# Patient Record
Sex: FEMALE | Race: WHITE | URBAN - METROPOLITAN AREA
[De-identification: names, ages, dates, MRNs, and addresses within clinical notes are randomized per-mention and may not be internally consistent; named-entity substitution may affect disease eponyms.]

---

## 2020-07-25 ENCOUNTER — TELEPHONE ENCOUNTER (OUTPATIENT)
Dept: URBAN - METROPOLITAN AREA CLINIC 13 | Facility: CLINIC | Age: 85
End: 2020-07-25

## 2020-07-25 RX ORDER — ASCORBIC ACID 500 MG
TAKE 1 TABLET DAILY TABLET ORAL
Refills: 0 | OUTPATIENT
Start: 2007-09-26 | End: 2011-08-19

## 2020-07-26 ENCOUNTER — TELEPHONE ENCOUNTER (OUTPATIENT)
Dept: URBAN - METROPOLITAN AREA CLINIC 13 | Facility: CLINIC | Age: 85
End: 2020-07-26

## 2020-07-26 RX ORDER — ASCORBIC ACID 500 MG
TAKE  TABLET  PT STATES 500MGS DAILY TABLET ORAL
Refills: 0 | Status: ACTIVE | COMMUNITY
Start: 2011-08-19

## 2020-07-26 RX ORDER — RALOXIFENE HCL 60 MG
TABLET ORAL
Qty: 30 | Refills: 0 | Status: ACTIVE | COMMUNITY
Start: 2013-10-14

## 2020-07-26 RX ORDER — METRONIDAZOLE 500 MG/1
TABLET ORAL
Qty: 30 | Refills: 0 | Status: ACTIVE | COMMUNITY
Start: 2014-07-10

## 2020-07-26 RX ORDER — CALCIUM CITRATE/VITAMIN D3 200MG-6.25
TAKE  TABLET DAILY PT STATES 400MGS TABLET ORAL
Refills: 0 | Status: ACTIVE | COMMUNITY
Start: 2011-08-19

## 2020-07-26 RX ORDER — AMOXICILLIN 875 MG/1
TABLET, FILM COATED ORAL
Qty: 28 | Refills: 0 | Status: ACTIVE | COMMUNITY
Start: 2012-01-10

## 2020-07-26 RX ORDER — MONTELUKAST SODIUM 10 MG/1
TABLET, FILM COATED ORAL
Qty: 90 | Refills: 0 | Status: ACTIVE | COMMUNITY
Start: 2014-04-21

## 2020-07-26 RX ORDER — CARBINOXAMINE MALEATE 4 MG/1
TABLET ORAL
Qty: 56 | Refills: 0 | Status: ACTIVE | COMMUNITY
Start: 2013-10-14

## 2020-07-26 RX ORDER — LEVOCETIRIZINE DIHYDROCHLORIDE 5 MG/1
TABLET ORAL
Qty: 30 | Refills: 0 | Status: ACTIVE | COMMUNITY
Start: 2012-01-10

## 2021-02-10 ENCOUNTER — OFFICE VISIT (OUTPATIENT)
Dept: URBAN - METROPOLITAN AREA CLINIC 107 | Facility: CLINIC | Age: 86
End: 2021-02-10

## 2021-03-04 ENCOUNTER — OFFICE VISIT (OUTPATIENT)
Dept: URBAN - METROPOLITAN AREA CLINIC 113 | Facility: CLINIC | Age: 86
End: 2021-03-04
Payer: MEDICARE

## 2021-03-04 ENCOUNTER — WEB ENCOUNTER (OUTPATIENT)
Dept: URBAN - METROPOLITAN AREA CLINIC 113 | Facility: CLINIC | Age: 86
End: 2021-03-04

## 2021-03-04 VITALS
DIASTOLIC BLOOD PRESSURE: 67 MMHG | SYSTOLIC BLOOD PRESSURE: 137 MMHG | RESPIRATION RATE: 20 BRPM | HEART RATE: 68 BPM | HEIGHT: 64 IN | TEMPERATURE: 97.8 F | WEIGHT: 100.5 LBS | BODY MASS INDEX: 17.16 KG/M2

## 2021-03-04 DIAGNOSIS — K58.2 IRRITABLE BOWEL SYNDROME WITH BOTH CONSTIPATION AND DIARRHEA: ICD-10-CM

## 2021-03-04 DIAGNOSIS — K92.89 GAS BLOAT SYNDROME: ICD-10-CM

## 2021-03-04 PROCEDURE — 99203 OFFICE O/P NEW LOW 30 MIN: CPT | Performed by: NURSE PRACTITIONER

## 2021-03-04 RX ORDER — METRONIDAZOLE 500 MG/1
TABLET ORAL
Qty: 30 | Refills: 0 | Status: DISCONTINUED | COMMUNITY
Start: 2014-07-10

## 2021-03-04 RX ORDER — MONTELUKAST SODIUM 10 MG/1
TABLET, FILM COATED ORAL
Qty: 90 | Refills: 0 | Status: DISCONTINUED | COMMUNITY
Start: 2014-04-21

## 2021-03-04 RX ORDER — CARBINOXAMINE MALEATE 4 MG/1
TABLET ORAL
Qty: 56 | Refills: 0 | Status: DISCONTINUED | COMMUNITY
Start: 2013-10-14

## 2021-03-04 RX ORDER — CALCIUM CITRATE/VITAMIN D3 200MG-6.25
TAKE  TABLET DAILY PT STATES 400MGS TABLET ORAL
Refills: 0 | Status: ACTIVE | COMMUNITY
Start: 2011-08-19

## 2021-03-04 RX ORDER — AMOXICILLIN 875 MG/1
TABLET, FILM COATED ORAL
Qty: 28 | Refills: 0 | Status: DISCONTINUED | COMMUNITY
Start: 2012-01-10

## 2021-03-04 RX ORDER — LEVOCETIRIZINE DIHYDROCHLORIDE 5 MG/1
TABLET ORAL
Qty: 30 | Refills: 0 | Status: DISCONTINUED | COMMUNITY
Start: 2012-01-10

## 2021-03-04 RX ORDER — ASCORBIC ACID 500 MG
TAKE  TABLET  PT STATES 500MGS DAILY TABLET ORAL
Refills: 0 | Status: ACTIVE | COMMUNITY
Start: 2011-08-19

## 2021-03-04 RX ORDER — RALOXIFENE HCL 60 MG
TABLET ORAL
Qty: 30 | Refills: 0 | Status: DISCONTINUED | COMMUNITY
Start: 2013-10-14

## 2021-03-04 NOTE — HPI-TODAY'S VISIT:
85-year-old woman with a history of diverticulosis, esophageal reflux, functional dyspepsia, mesenteric cyst (resolved), benign liver hemangiomas, presenting for evaluation of diarrhea. She was referred back to our office on 1/14/2021 by Dr. Denney.  According to referral notes, she has a long history of diarrhea and abdominal pain following an extensive work-up with Dr. Espinoza, including colonoscopy, EGD and CT imaging.  She presents today with complaints of alternating bowel habits. She complains of generalized abdominal cramping. Cramping typically occurs post prandially. Cramping typically resolves on its own. It is unclear if she has improvement of abdominal pain with passing a bowel movement or passing gas. Lying flat and becoming completely still helps the pain to alleviate.

## 2021-04-15 ENCOUNTER — TELEPHONE ENCOUNTER (OUTPATIENT)
Dept: URBAN - METROPOLITAN AREA CLINIC 113 | Facility: CLINIC | Age: 86
End: 2021-04-15

## 2021-05-04 ENCOUNTER — OFFICE VISIT (OUTPATIENT)
Dept: URBAN - METROPOLITAN AREA CLINIC 113 | Facility: CLINIC | Age: 86
End: 2021-05-04
Payer: MEDICARE

## 2021-05-04 ENCOUNTER — WEB ENCOUNTER (OUTPATIENT)
Dept: URBAN - METROPOLITAN AREA CLINIC 113 | Facility: CLINIC | Age: 86
End: 2021-05-04

## 2021-05-04 ENCOUNTER — TELEPHONE ENCOUNTER (OUTPATIENT)
Dept: URBAN - METROPOLITAN AREA CLINIC 113 | Facility: CLINIC | Age: 86
End: 2021-05-04

## 2021-05-04 VITALS
DIASTOLIC BLOOD PRESSURE: 71 MMHG | HEIGHT: 64 IN | SYSTOLIC BLOOD PRESSURE: 125 MMHG | BODY MASS INDEX: 16.39 KG/M2 | WEIGHT: 96 LBS | TEMPERATURE: 97.1 F | HEART RATE: 72 BPM

## 2021-05-04 DIAGNOSIS — K40.90 INGUINAL HERNIA: ICD-10-CM

## 2021-05-04 DIAGNOSIS — K57.90 DIVERTICULOSIS: ICD-10-CM

## 2021-05-04 DIAGNOSIS — K58.2 IRRITABLE BOWEL SYNDROME WITH BOTH CONSTIPATION AND DIARRHEA: ICD-10-CM

## 2021-05-04 PROCEDURE — 99214 OFFICE O/P EST MOD 30 MIN: CPT | Performed by: INTERNAL MEDICINE

## 2021-05-04 RX ORDER — CALCIUM CITRATE/VITAMIN D3 200MG-6.25
TAKE  TABLET DAILY PT STATES 400MGS TABLET ORAL
Refills: 0 | Status: ACTIVE | COMMUNITY
Start: 2011-08-19

## 2021-05-04 RX ORDER — ASCORBIC ACID 500 MG
TAKE  TABLET  PT STATES 500MGS DAILY TABLET ORAL
Refills: 0 | Status: ACTIVE | COMMUNITY
Start: 2011-08-19

## 2021-05-04 NOTE — HPI-TODAY'S VISIT:
85-year-old woman with a history of diverticulosis, esophageal reflux, functional dyspepsia, mesenteric cyst (resolved), benign liver hemangiomas, presenting for evaluation of diarrhea. . She was referred back to our office on 1/14/2021 by Dr. Denney.  She has been having abdominal pain periodically.  She did go to the ER and had a CT scan in April which revealed diverticulitis and she was treated with Cipro and Flagyl.  Doing a bit better at this time.  Utilizing dicyclomine as required.  Also utilizing Pepto-Bismol.  No recent reports of fever or significant abdominal distention. . Celiac serologies were negative Jan 2021. . CT scan April 12, 2021.  Wall thickening and edema of the sigmoid colon identified with stranding consistent with diverticulitis.  Left inguinal hernia with small intestinal loops without obstruction identified.  . Colonoscopy by Dr. Espinoza Oct 2020. Diverticulosis and reportedly no polyps noted. . CT scan on 7-22-11 that revealed mild outward bulging slightly indurated mesenteric fat in the left pelvis concerning for panniculitis.  . EGD 8-19-11. Findings included mild granular mucosa at the GEJ, a small hiatal hernia and extrinsic compression of the gastric fundus. Gastric biopsies were negative for H. pylori. . Colonoscopy 9/19/07 tortuous sigmoid colon, a "floppy colon", moderate sigmoid diverticulosis, and was otherwise negative.

## 2021-09-20 ENCOUNTER — OFFICE VISIT (OUTPATIENT)
Dept: URBAN - METROPOLITAN AREA CLINIC 113 | Facility: CLINIC | Age: 86
End: 2021-09-20
Payer: MEDICARE

## 2021-09-20 VITALS
SYSTOLIC BLOOD PRESSURE: 129 MMHG | RESPIRATION RATE: 20 BRPM | TEMPERATURE: 97.8 F | WEIGHT: 95 LBS | HEIGHT: 64 IN | DIASTOLIC BLOOD PRESSURE: 70 MMHG | HEART RATE: 76 BPM | BODY MASS INDEX: 16.22 KG/M2

## 2021-09-20 DIAGNOSIS — K57.90 DIVERTICULOSIS: ICD-10-CM

## 2021-09-20 DIAGNOSIS — K40.90 INGUINAL HERNIA: ICD-10-CM

## 2021-09-20 DIAGNOSIS — K58.2 IRRITABLE BOWEL SYNDROME WITH BOTH CONSTIPATION AND DIARRHEA: ICD-10-CM

## 2021-09-20 PROBLEM — 398050005 DIVERTICULAR DISEASE OF COLON: Status: ACTIVE | Noted: 2021-05-04

## 2021-09-20 PROCEDURE — 99213 OFFICE O/P EST LOW 20 MIN: CPT | Performed by: INTERNAL MEDICINE

## 2021-09-20 RX ORDER — CALCIUM CITRATE/VITAMIN D3 200MG-6.25
TAKE  TABLET DAILY PT STATES 400MGS TABLET ORAL
Refills: 0 | Status: ACTIVE | COMMUNITY
Start: 2011-08-19

## 2021-09-20 RX ORDER — ASCORBIC ACID 500 MG
TAKE  TABLET  PT STATES 500MGS DAILY TABLET ORAL
Refills: 0 | Status: ACTIVE | COMMUNITY
Start: 2011-08-19

## 2021-09-20 NOTE — HPI-TODAY'S VISIT:
85-year-old woman with a history of diverticulosis, esophageal reflux, functional dyspepsia, mesenteric cyst (resolved), benign liver hemangiomas, presenting for evaluation of diarrhea. . She was referred back to our office on 1/14/2021 by Dr. Denney.  She has been having abdominal pain periodically.  She did go to the ER and had a CT scan in April which revealed diverticulitis and she was treated with Cipro and Flagyl.  Doing a bit better at this time.  Utilizing dicyclomine as required.  Also utilizing Pepto-Bismol.  No recent reports of fever or significant abdominal distention. . Sept 20, 2021. Overall doing very well at this time and tries to follow the FODMAP diet.  She is walking every day.  Asked about B12.  I do not think it will hurt her.  "The shot. . Celiac serologies were negative Jan 2021. . CT scan April 12, 2021.  Wall thickening and edema of the sigmoid colon identified with stranding consistent with diverticulitis.  Left inguinal hernia with small intestinal loops without obstruction identified.  . Colonoscopy by Dr. Espinoza Oct 2020. Diverticulosis and reportedly no polyps noted. . CT scan on 7-22-11 that revealed mild outward bulging slightly indurated mesenteric fat in the left pelvis concerning for panniculitis.  . EGD 8-19-11. Findings included mild granular mucosa at the GEJ, a small hiatal hernia and extrinsic compression of the gastric fundus. Gastric biopsies were negative for H. pylori. . Colonoscopy 9/19/07 tortuous sigmoid colon, a "floppy colon", moderate sigmoid diverticulosis, and was otherwise negative.

## 2022-03-21 ENCOUNTER — OFFICE VISIT (OUTPATIENT)
Dept: URBAN - METROPOLITAN AREA CLINIC 113 | Facility: CLINIC | Age: 87
End: 2022-03-21
Payer: MEDICARE

## 2022-03-21 VITALS
RESPIRATION RATE: 20 BRPM | WEIGHT: 102 LBS | DIASTOLIC BLOOD PRESSURE: 68 MMHG | TEMPERATURE: 98.2 F | HEIGHT: 64 IN | HEART RATE: 79 BPM | SYSTOLIC BLOOD PRESSURE: 121 MMHG | BODY MASS INDEX: 17.42 KG/M2

## 2022-03-21 DIAGNOSIS — K58.2 IRRITABLE BOWEL SYNDROME WITH BOTH CONSTIPATION AND DIARRHEA: ICD-10-CM

## 2022-03-21 PROCEDURE — 99213 OFFICE O/P EST LOW 20 MIN: CPT | Performed by: INTERNAL MEDICINE

## 2022-03-21 RX ORDER — ASCORBIC ACID 500 MG
TAKE  TABLET  PT STATES 500MGS DAILY TABLET ORAL
Refills: 0 | Status: ACTIVE | COMMUNITY
Start: 2011-08-19

## 2022-03-21 RX ORDER — CALCIUM CITRATE/VITAMIN D3 200MG-6.25
TAKE  TABLET DAILY PT STATES 400MGS TABLET ORAL
Refills: 0 | Status: ACTIVE | COMMUNITY
Start: 2011-08-19

## 2022-03-21 RX ORDER — PNV NO.95/FERROUS FUM/FOLIC AC 28MG-0.8MG
AS DIRECTED TABLET ORAL
Status: ACTIVE | COMMUNITY

## 2022-03-21 NOTE — HPI-TODAY'S VISIT:
86-year-old female with a history of diverticulitis, esophageal reflux, functional dyspepsia, mesenteric cyst (resolved), benign liver hemangiomas, presenting for follow-up of diarrhea. She was seen in the office in September 2021 for follow-up of alternating bowel habits attributed to irritable bowel syndrome. Her symptoms had improved with a low-FODMAP diet. She was encouraged use of Benefiber and MOM when needed. Overall, she is doing well. She has a nonbloody stool most days with the use of milk of magnesia when needed. She has not experienced further diarrhea. She continues to follow a modified low-FODMAP diet and exercises regularly. About once a week, she may experience an exacerbation of abdominal pain which responds to Pepto Bismol when needed. This has lessened in frequency and severity. She never filled the dicyclomine prescription provided a few visits ago. She is taking vitamin B12 and Turmeric which she feels have been helpful. She does admit to an increase in family-related stress.

## 2022-03-21 NOTE — HPI-OTHER HISTORIES
CT scan April 12, 2021.  Wall thickening and edema of the sigmoid colon identified with stranding consistent with diverticulitis.  Left inguinal hernia with small intestinal loops without obstruction identified.  Celiac serologies were negative Jan 2021. Colonoscopy by Dr. Espinoza Oct 2020: distal rectal nodule 1cm at dentate and sigmoid diverticulosis. Biopsies showed mucosal prolapse polyp with focal erosion/solitary rectal ulcer syndrome, negative for dysplasia.  EGD 8/19/11: mild granular mucosa at the GEJ, a small hiatal hernia and extrinsic compression of the gastric fundus. Gastric biopsies were negative for H. pylori. Colonoscopy 9/19/07 tortuous sigmoid colon, a "floppy colon", moderate sigmoid diverticulosis, and was otherwise negative.

## 2022-07-20 ENCOUNTER — CLAIMS CREATED FROM THE CLAIM WINDOW (OUTPATIENT)
Dept: URBAN - METROPOLITAN AREA MEDICAL CENTER 19 | Facility: MEDICAL CENTER | Age: 87
End: 2022-07-20
Payer: MEDICARE

## 2022-07-20 DIAGNOSIS — R63.0 DECREASED APPETITE: ICD-10-CM

## 2022-07-20 DIAGNOSIS — R11.0 NAUSEA: ICD-10-CM

## 2022-07-20 DIAGNOSIS — R10.84 ABDOMINAL CRAMPING, GENERALIZED: ICD-10-CM

## 2022-07-20 DIAGNOSIS — K40.90 INGUINAL HERNIA: ICD-10-CM

## 2022-07-20 DIAGNOSIS — K59.09 CHANGE IN BOWEL MOVEMENTS INTERMITTENT CONSTIPATION. URGENCY IN THE MORNING.: ICD-10-CM

## 2022-07-20 PROCEDURE — 99253 IP/OBS CNSLTJ NEW/EST LOW 45: CPT | Performed by: INTERNAL MEDICINE

## 2022-07-20 PROCEDURE — 99221 1ST HOSP IP/OBS SF/LOW 40: CPT | Performed by: INTERNAL MEDICINE

## 2022-07-30 PROBLEM — 79890006 LOSS OF APPETITE: Status: ACTIVE | Noted: 2022-07-30

## 2022-07-30 PROBLEM — 396232000 INGUINAL HERNIA: Status: ACTIVE | Noted: 2021-05-04

## 2022-08-31 ENCOUNTER — CLAIMS CREATED FROM THE CLAIM WINDOW (OUTPATIENT)
Dept: URBAN - METROPOLITAN AREA CLINIC 113 | Facility: CLINIC | Age: 87
End: 2022-08-31
Payer: MEDICARE

## 2022-08-31 VITALS
BODY MASS INDEX: 17.58 KG/M2 | HEART RATE: 91 BPM | RESPIRATION RATE: 20 BRPM | DIASTOLIC BLOOD PRESSURE: 72 MMHG | SYSTOLIC BLOOD PRESSURE: 128 MMHG | TEMPERATURE: 97.8 F | WEIGHT: 103 LBS | HEIGHT: 64 IN

## 2022-08-31 DIAGNOSIS — K58.2 IRRITABLE BOWEL SYNDROME WITH BOTH CONSTIPATION AND DIARRHEA: ICD-10-CM

## 2022-08-31 DIAGNOSIS — D49.0 IPMN (INTRADUCTAL PAPILLARY MUCINOUS NEOPLASM): ICD-10-CM

## 2022-08-31 DIAGNOSIS — R14.0 ABDOMINAL BLOATING: ICD-10-CM

## 2022-08-31 DIAGNOSIS — K40.20 BILATERAL INGUINAL HERNIA WITHOUT OBSTRUCTION OR GANGRENE, RECURRENCE NOT SPECIFIED: ICD-10-CM

## 2022-08-31 DIAGNOSIS — R11.0 NAUSEA: ICD-10-CM

## 2022-08-31 PROCEDURE — 99214 OFFICE O/P EST MOD 30 MIN: CPT

## 2022-08-31 RX ORDER — PNV NO.95/FERROUS FUM/FOLIC AC 28MG-0.8MG
AS DIRECTED TABLET ORAL
Status: ACTIVE | COMMUNITY

## 2022-08-31 RX ORDER — ASCORBIC ACID 500 MG
TAKE  TABLET  PT STATES 500MGS DAILY TABLET ORAL
Refills: 0 | Status: ACTIVE | COMMUNITY
Start: 2011-08-19

## 2022-08-31 RX ORDER — ONDANSETRON 4 MG/1
1 TABLET ON THE TONGUE AND ALLOW TO DISSOLVE TABLET, ORALLY DISINTEGRATING ORAL
Qty: 90 | Refills: 0 | OUTPATIENT
Start: 2022-08-31

## 2022-08-31 RX ORDER — DICYCLOMINE HYDROCHLORIDE 10 MG/1
1 CAPSULE CAPSULE ORAL
Qty: 90 | Refills: 1 | OUTPATIENT
Start: 2022-08-31 | End: 2022-10-30

## 2022-08-31 RX ORDER — HYDROCODONE BITARTRATE AND ACETAMINOPHEN 10; 325 MG/1; MG/1
1 TABLET AS NEEDED TABLET ORAL
Status: ACTIVE | COMMUNITY

## 2022-08-31 RX ORDER — CALCIUM CITRATE/VITAMIN D3 200MG-6.25
TAKE  TABLET DAILY PT STATES 400MGS TABLET ORAL
Refills: 0 | Status: ACTIVE | COMMUNITY
Start: 2011-08-19

## 2022-08-31 NOTE — HPI-TODAY'S VISIT:
86-year-old female with a history of diverticulitis, esophageal reflux, functional dyspepsia, mesenteric cyst (resolved), benign liver hemangiomas presents for follow-up regarding irritable bowel syndrome.  She was last seen on 3/21/2022.  She has a history of abdominal pain and alternating bowel habits attributed to irritable bowel syndrome.  At that time her symptoms will well managed on regimen consisting of a low FODMAP diet and milk of magnesia when needed.  We did discuss the possible benefits of IBgard.  Dicyclomine has been provided in the past however patient never filled this prescription.  She was to continue her current regimen including Pepto-Bismol use as needed.  We would consider IBgard 1 to 2 capsules as needed for abdominal pain in the future.  She had an MRI on her back two weeks ago wihch was an awful experience.  Patient was recently seen as inpatient consultation on 7/12/2022 by Dr. Nichole.  She was admitted with acute on chronic GI symptoms, predominantly nausea and constipation.  Labs at that time revealed normal H/H, normal BMP, normal LFTs with exception of .  CT scan abdomen/pelvis with contrast revealed fluid-filled and slightly distended colon, likely representing diarrheal illness.  There was a compression deformity of the T12 vertebral body.  This is indeterminate in age and correlation with lower thoracic/upper back pain is recommended.  There is a stable left of 0.5 x 0.7 cm pancreatic tail hypoattenuating lesion which may represent a pancreatic IPMN.  CT findings most likely due to patient's severe constipation.  NORRIS was clean without any infection.  Her symptoms have largely improved following a bowel movement after taking a bowel prep.  She was discharged and advised to continue with daily regimen of either milk of magnesia or MiraLAX on a daily basis.  Patient presents today to wheelchair due to severe back and hip pain and her daughter is with her.  The daughter helps to provide most of her history as the patient has severe hearing loss.  The daughter became tearful and upset during the visit while telling me her mother continues to be in pain and no one seems to be able to help her.  She states after her visit with Dr. Nichole in the hospital, she continued to have issues with early satiety, abdominal bloating and decreased appetite.  She has tried and failed IBgard and a low FODMAP diet.  She is not able to tolerate MiraLAX as this caused abdominal cramping.  She cannot tolerate Linzess.  She is seeing Dr. Garg tomorrow for her back.  She had an MRI on her spine a few weeks ago. She may be undergoing some form of treatment for her back and hip.  She is on narcotics for her pain as well.  Patient states her back pain is her most bothersome symptom at this time as well as her abdominal bloating.  She states her bowels will move daily as long as she takes milk of magnesia at night however she is scared to take this the night before her doctors appointments that she does not wish to use the bathrooms.  She has not noticed any specific food trigger for her bloating.  The patient's daughter is not sure if she was ever tried dicyclomine in the past.  She was given Zofran during her hospital course which had helped with nausea however she does not have any left.  She tries to drink excess water to keep her bowels moving however this makes her feel extremely full and bloated.  She does take Tylenol for pain relief as well.  She only recently started using ibuprofen on a very intermittent basis.

## 2022-09-22 ENCOUNTER — TELEPHONE ENCOUNTER (OUTPATIENT)
Dept: URBAN - METROPOLITAN AREA CLINIC 113 | Facility: CLINIC | Age: 87
End: 2022-09-22

## 2022-10-03 ENCOUNTER — LAB OUTSIDE AN ENCOUNTER (OUTPATIENT)
Dept: URBAN - METROPOLITAN AREA CLINIC 113 | Facility: CLINIC | Age: 87
End: 2022-10-03

## 2022-10-03 ENCOUNTER — TELEPHONE ENCOUNTER (OUTPATIENT)
Dept: URBAN - METROPOLITAN AREA CLINIC 113 | Facility: CLINIC | Age: 87
End: 2022-10-03

## 2022-10-03 PROBLEM — 14760008 CONSTIPATION: Status: ACTIVE | Noted: 2022-10-03

## 2022-10-03 RX ORDER — DICYCLOMINE HYDROCHLORIDE 10 MG/1
1 CAPSULE CAPSULE ORAL
Qty: 90 | Refills: 1 | Status: ACTIVE | COMMUNITY
Start: 2022-08-31 | End: 2022-10-30

## 2022-10-03 RX ORDER — ONDANSETRON 4 MG/1
1 TABLET ON THE TONGUE AND ALLOW TO DISSOLVE TABLET, ORALLY DISINTEGRATING ORAL
Qty: 90 | Refills: 0 | Status: ACTIVE | COMMUNITY
Start: 2022-08-31

## 2022-10-03 RX ORDER — HYDROCODONE BITARTRATE AND ACETAMINOPHEN 10; 325 MG/1; MG/1
1 TABLET AS NEEDED TABLET ORAL
Status: ACTIVE | COMMUNITY

## 2022-10-03 RX ORDER — LINACLOTIDE 145 UG/1
1 CAPSULE AT LEAST 30 MINUTES BEFORE THE FIRST MEAL OF THE DAY ON AN EMPTY STOMACH CAPSULE, GELATIN COATED ORAL ONCE A DAY
Qty: 30 | Refills: 6 | OUTPATIENT
Start: 2022-10-03 | End: 2023-05-01

## 2022-10-03 RX ORDER — CALCIUM CITRATE/VITAMIN D3 200MG-6.25
TAKE  TABLET DAILY PT STATES 400MGS TABLET ORAL
Refills: 0 | Status: ACTIVE | COMMUNITY
Start: 2011-08-19

## 2022-10-03 RX ORDER — ASCORBIC ACID 500 MG
TAKE  TABLET  PT STATES 500MGS DAILY TABLET ORAL
Refills: 0 | Status: ACTIVE | COMMUNITY
Start: 2011-08-19

## 2022-10-03 RX ORDER — PNV NO.95/FERROUS FUM/FOLIC AC 28MG-0.8MG
AS DIRECTED TABLET ORAL
Status: ACTIVE | COMMUNITY

## 2022-10-04 ENCOUNTER — OFFICE VISIT (OUTPATIENT)
Dept: URBAN - METROPOLITAN AREA CLINIC 113 | Facility: CLINIC | Age: 87
End: 2022-10-04

## 2022-10-11 ENCOUNTER — TELEPHONE ENCOUNTER (OUTPATIENT)
Dept: URBAN - METROPOLITAN AREA CLINIC 113 | Facility: CLINIC | Age: 87
End: 2022-10-11

## 2022-12-01 ENCOUNTER — TELEPHONE ENCOUNTER (OUTPATIENT)
Dept: URBAN - METROPOLITAN AREA CLINIC 113 | Facility: CLINIC | Age: 87
End: 2022-12-01

## 2022-12-01 ENCOUNTER — OFFICE VISIT (OUTPATIENT)
Dept: URBAN - METROPOLITAN AREA CLINIC 113 | Facility: CLINIC | Age: 87
End: 2022-12-01
Payer: MEDICARE

## 2022-12-01 VITALS
HEART RATE: 95 BPM | SYSTOLIC BLOOD PRESSURE: 112 MMHG | TEMPERATURE: 97.3 F | BODY MASS INDEX: 13.28 KG/M2 | WEIGHT: 77.8 LBS | HEIGHT: 64 IN | RESPIRATION RATE: 16 BRPM | DIASTOLIC BLOOD PRESSURE: 67 MMHG

## 2022-12-01 DIAGNOSIS — K58.2 IRRITABLE BOWEL SYNDROME WITH BOTH CONSTIPATION AND DIARRHEA: ICD-10-CM

## 2022-12-01 DIAGNOSIS — R14.0 ABDOMINAL BLOATING: ICD-10-CM

## 2022-12-01 PROBLEM — 82934008 CHRONIC IDIOPATHIC CONSTIPATION: Status: ACTIVE | Noted: 2022-12-01

## 2022-12-01 PROBLEM — 10743008: Status: ACTIVE | Noted: 2021-03-04

## 2022-12-01 PROCEDURE — 99214 OFFICE O/P EST MOD 30 MIN: CPT | Performed by: NURSE PRACTITIONER

## 2022-12-01 RX ORDER — METRONIDAZOLE 250 MG/1
1 TABLET TABLET ORAL TWICE A DAY
Qty: 14 TABLET | Refills: 0 | OUTPATIENT

## 2022-12-01 RX ORDER — PLECANATIDE 3 MG/1
1 TABLET TABLET ORAL ONCE A DAY
Qty: 90 TABLET | Refills: 3 | OUTPATIENT
Start: 2022-12-01 | End: 2023-11-26

## 2022-12-01 RX ORDER — PREGABALIN 25 MG/1
1 CAPSULE CAPSULE ORAL ONCE A DAY
Status: ACTIVE | COMMUNITY

## 2022-12-01 RX ORDER — ASCORBIC ACID 500 MG
TAKE  TABLET  PT STATES 500MGS DAILY TABLET ORAL
Refills: 0 | Status: ACTIVE | COMMUNITY
Start: 2011-08-19

## 2022-12-01 RX ORDER — LINACLOTIDE 145 UG/1
1 CAPSULE AT LEAST 30 MINUTES BEFORE THE FIRST MEAL OF THE DAY ON AN EMPTY STOMACH CAPSULE, GELATIN COATED ORAL ONCE A DAY
Qty: 30 | Refills: 6 | Status: ON HOLD | COMMUNITY
Start: 2022-10-03 | End: 2023-05-01

## 2022-12-01 RX ORDER — HYDROCODONE BITARTRATE AND ACETAMINOPHEN 10; 325 MG/1; MG/1
1 TABLET AS NEEDED TABLET ORAL
Status: ACTIVE | COMMUNITY

## 2022-12-01 RX ORDER — ONDANSETRON 4 MG/1
1 TABLET ON THE TONGUE AND ALLOW TO DISSOLVE TABLET, ORALLY DISINTEGRATING ORAL
Qty: 90 | Refills: 0 | Status: ACTIVE | COMMUNITY
Start: 2022-08-31

## 2022-12-01 RX ORDER — CALCIUM CITRATE/VITAMIN D3 200MG-6.25
TAKE  TABLET DAILY PT STATES 400MGS TABLET ORAL
Refills: 0 | Status: ACTIVE | COMMUNITY
Start: 2011-08-19

## 2022-12-01 RX ORDER — PNV NO.95/FERROUS FUM/FOLIC AC 28MG-0.8MG
AS DIRECTED TABLET ORAL
Status: ACTIVE | COMMUNITY

## 2023-03-02 ENCOUNTER — OFFICE VISIT (OUTPATIENT)
Dept: URBAN - METROPOLITAN AREA CLINIC 113 | Facility: CLINIC | Age: 88
End: 2023-03-02
Payer: MEDICARE

## 2023-03-02 VITALS
HEART RATE: 90 BPM | WEIGHT: 78 LBS | RESPIRATION RATE: 20 BRPM | HEIGHT: 64 IN | SYSTOLIC BLOOD PRESSURE: 130 MMHG | BODY MASS INDEX: 13.32 KG/M2 | DIASTOLIC BLOOD PRESSURE: 72 MMHG | TEMPERATURE: 97.9 F

## 2023-03-02 DIAGNOSIS — R10.84 GENERALIZED ABDOMINAL PAIN: ICD-10-CM

## 2023-03-02 DIAGNOSIS — K58.1 IRRITABLE BOWEL SYNDROME WITH CONSTIPATION: ICD-10-CM

## 2023-03-02 DIAGNOSIS — R93.5 ABNORMAL ABDOMINAL CT SCAN: ICD-10-CM

## 2023-03-02 PROBLEM — 440630006 IRRITABLE BOWEL SYNDROME CHARACTERIZED BY CONSTIPATION: Status: ACTIVE | Noted: 2023-03-02

## 2023-03-02 PROBLEM — 102614006 GENERALIZED ABDOMINAL PAIN: Status: ACTIVE | Noted: 2022-10-03

## 2023-03-02 PROCEDURE — 99214 OFFICE O/P EST MOD 30 MIN: CPT | Performed by: NURSE PRACTITIONER

## 2023-03-02 RX ORDER — ONDANSETRON 4 MG/1
1 TABLET ON THE TONGUE AND ALLOW TO DISSOLVE TABLET, ORALLY DISINTEGRATING ORAL
Qty: 90 | Refills: 0 | Status: ACTIVE | COMMUNITY
Start: 2022-08-31

## 2023-03-02 RX ORDER — PREGABALIN 25 MG/1
1 CAPSULE CAPSULE ORAL ONCE A DAY
Status: ACTIVE | COMMUNITY

## 2023-03-02 RX ORDER — PNV NO.95/FERROUS FUM/FOLIC AC 28MG-0.8MG
AS DIRECTED TABLET ORAL
Status: ACTIVE | COMMUNITY

## 2023-03-02 RX ORDER — LINACLOTIDE 145 UG/1
1 CAPSULE AT LEAST 30 MINUTES BEFORE THE FIRST MEAL OF THE DAY ON AN EMPTY STOMACH CAPSULE, GELATIN COATED ORAL ONCE A DAY
Qty: 30 | Refills: 6 | Status: ON HOLD | COMMUNITY
Start: 2022-10-03 | End: 2023-05-01

## 2023-03-02 RX ORDER — CALCIUM CITRATE/VITAMIN D3 200MG-6.25
TAKE  TABLET DAILY PT STATES 400MGS TABLET ORAL
Refills: 0 | Status: ACTIVE | COMMUNITY
Start: 2011-08-19

## 2023-03-02 RX ORDER — PLECANATIDE 3 MG/1
1 TABLET TABLET ORAL ONCE A DAY
Qty: 90 TABLET | Refills: 3 | Status: ACTIVE | COMMUNITY
Start: 2022-12-01 | End: 2023-11-26

## 2023-03-02 RX ORDER — ASCORBIC ACID 500 MG
TAKE  TABLET  PT STATES 500MGS DAILY TABLET ORAL
Refills: 0 | Status: ACTIVE | COMMUNITY
Start: 2011-08-19

## 2023-03-02 RX ORDER — METRONIDAZOLE 250 MG/1
1 TABLET TABLET ORAL TWICE A DAY
Qty: 14 TABLET | Refills: 0 | Status: ACTIVE | COMMUNITY

## 2023-03-02 RX ORDER — TENAPANOR HYDROCHLORIDE 53.2 MG/1
1 TABLET PRIOR TO BREAKFAST AND DINNER TABLET ORAL TWICE A DAY
Qty: 180 TABLET | Refills: 3 | OUTPATIENT
Start: 2023-03-02 | End: 2024-02-25

## 2023-03-02 RX ORDER — HYDROCODONE BITARTRATE AND ACETAMINOPHEN 10; 325 MG/1; MG/1
1 TABLET AS NEEDED TABLET ORAL
Status: ACTIVE | COMMUNITY

## 2023-03-02 NOTE — HPI-OTHER HISTORIES
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 Labs 10/4/2022:H/H12.3/37.9, MCV 94.8, , WBC 11.1.  AST 24, ALT 15, .59, T bili 0.4, CMP otherwise unremarkable. CT scan April 12, 2021.  Wall thickening and edema of the sigmoid colon identified with stranding consistent with diverticulitis.  Left inguinal hernia with small intestinal loops without obstruction identified.  Celiac serologies were negative Jan 2021. Colonoscopy by Dr. Espinoza Oct 2020: distal rectal nodule 1cm at dentate and sigmoid diverticulosis. Biopsies showed mucosal prolapse polyp with focal erosion/solitary rectal ulcer syndrome, negative for dysplasia.  EGD 8/19/11: mild granular mucosa at the GEJ, a small hiatal hernia and extrinsic compression of the gastric fundus. Gastric biopsies were negative for H. pylori. Colonoscopy 9/19/07 tortuous sigmoid colon, a "floppy colon", moderate sigmoid diverticulosis, and was otherwise negative.

## 2023-03-02 NOTE — HPI-TODAY'S VISIT:
87-year-old female with a history of diverticulitis, esophageal reflux, functional dyspepsia, mesenteric cyst (resolved), benign liver hemangiomas, presenting for follow-up of abdominal pain, bloating, and constipation.  She was seen in the office in December for evaluation of worsening abdominal pain, bloating and constipation despite dietary modification and milk of magnesia. She was unable to tolerate Linzess due to abdominal cramping and IBgard was ineffective. She was recommended a course of metronidazole for IBS and SIBO, followed by initiation of a daily bowel regimen with Trulance. She was to continue Gas-X as needed. She did not follow any of the recommendations given at the time of the last visit. She did not take the antibiotics due to concern for potential side effects. She was unable to obtain the Trulance due to cost, but unfortunately this was not communicated with our office.  Her symptoms are unchanged. She and her daughter are both visibly frustrated. She has a bowel movement on the days she takes milk of magnesia. She will take it for about two days then hold it for a day, simply because she gets tired of taking the medication. She continues to experience lower abdominal pain, gas and bloating following meals. This does respond to Pepto Bismol when needed, about twice per week. Gas-X and IBgard have not been helpful. She eats very little. She does drink small amounts of ginger ale, sprite and dr. pepper a few times per week. They do not believe this is contributing. Fortunately, her weight is stable. Her symptoms are not new; this has been ongoing for decades.

## 2023-03-03 LAB
A/G RATIO: 2.1
ABSOLUTE BASOPHILS: 57
ABSOLUTE EOSINOPHILS: 50
ABSOLUTE LYMPHOCYTES: 1356
ABSOLUTE MONOCYTES: 383
ABSOLUTE NEUTROPHILS: 5254
ALBUMIN: 4.1
ALKALINE PHOSPHATASE: 131
ALT (SGPT): 30
AST (SGOT): 24
BASOPHILS: 0.8
BILIRUBIN, TOTAL: 0.3
BUN/CREATININE RATIO: (no result)
BUN: 20
CALCIUM: 9.1
CARBON DIOXIDE, TOTAL: 28
CHLORIDE: 104
CREATININE: 0.6
EGFR: 88
EOSINOPHILS: 0.7
GLOBULIN, TOTAL: 2
GLUCOSE: 114
HEMATOCRIT: 39.2
HEMOGLOBIN: 13.5
IMMUNOGLOBULIN A: 289
INTERPRETATION: (no result)
LIPASE: 27
LYMPHOCYTES: 19.1
MCH: 30.4
MCHC: 34.4
MCV: 88.3
MONOCYTES: 5.4
MPV: 10.7
NEUTROPHILS: 74
PLATELET COUNT: 309
POTASSIUM: 4.2
PROTEIN, TOTAL: 6.1
RDW: 12.8
RED BLOOD CELL COUNT: 4.44
SODIUM: 140
TISSUE TRANSGLUTAMINASE AB, IGA: <1
TSH W/REFLEX TO FT4: 4.07
WHITE BLOOD CELL COUNT: 7.1

## 2023-03-14 ENCOUNTER — TELEPHONE ENCOUNTER (OUTPATIENT)
Dept: URBAN - METROPOLITAN AREA CLINIC 113 | Facility: CLINIC | Age: 88
End: 2023-03-14

## 2023-05-24 ENCOUNTER — WEB ENCOUNTER (OUTPATIENT)
Dept: URBAN - METROPOLITAN AREA CLINIC 113 | Facility: CLINIC | Age: 88
End: 2023-05-24

## 2023-05-30 ENCOUNTER — OFFICE VISIT (OUTPATIENT)
Dept: URBAN - METROPOLITAN AREA CLINIC 113 | Facility: CLINIC | Age: 88
End: 2023-05-30
Payer: MEDICARE

## 2023-05-30 VITALS
DIASTOLIC BLOOD PRESSURE: 81 MMHG | TEMPERATURE: 97.3 F | SYSTOLIC BLOOD PRESSURE: 145 MMHG | HEART RATE: 100 BPM | BODY MASS INDEX: 13.35 KG/M2 | HEIGHT: 64 IN | RESPIRATION RATE: 20 BRPM | WEIGHT: 78.2 LBS

## 2023-05-30 DIAGNOSIS — K58.1 IRRITABLE BOWEL SYNDROME WITH CONSTIPATION: ICD-10-CM

## 2023-05-30 DIAGNOSIS — R10.84 GENERALIZED ABDOMINAL PAIN: ICD-10-CM

## 2023-05-30 DIAGNOSIS — K57.50 DIVERTICULOSIS OF BOTH SMALL AND LARGE INTESTINE: ICD-10-CM

## 2023-05-30 DIAGNOSIS — K21.9 GERD: ICD-10-CM

## 2023-05-30 PROCEDURE — 99214 OFFICE O/P EST MOD 30 MIN: CPT | Performed by: INTERNAL MEDICINE

## 2023-05-30 RX ORDER — PNV NO.95/FERROUS FUM/FOLIC AC 28MG-0.8MG
AS DIRECTED TABLET ORAL
Status: ACTIVE | COMMUNITY

## 2023-05-30 RX ORDER — MIRTAZAPINE 15 MG/1
1 TABLET AT BEDTIME TABLET, FILM COATED ORAL ONCE A DAY
Status: ACTIVE | COMMUNITY

## 2023-05-30 RX ORDER — PANTOPRAZOLE SODIUM 20 MG/1
1 TABLET TABLET, DELAYED RELEASE ORAL ONCE A DAY
Qty: 30 | Refills: 11 | OUTPATIENT
Start: 2023-05-30

## 2023-05-30 RX ORDER — HYDROCODONE BITARTRATE AND ACETAMINOPHEN 10; 325 MG/1; MG/1
1 TABLET AS NEEDED TABLET ORAL
Status: ACTIVE | COMMUNITY

## 2023-05-30 RX ORDER — TENAPANOR HYDROCHLORIDE 53.2 MG/1
1 TABLET PRIOR TO BREAKFAST AND DINNER TABLET ORAL TWICE A DAY
Qty: 180 TABLET | Refills: 3 | Status: ACTIVE | COMMUNITY
Start: 2023-03-02 | End: 2024-02-25

## 2023-05-30 RX ORDER — ONDANSETRON 4 MG/1
1 TABLET ON THE TONGUE AND ALLOW TO DISSOLVE TABLET, ORALLY DISINTEGRATING ORAL
Qty: 90 | Refills: 0 | Status: ACTIVE | COMMUNITY
Start: 2022-08-31

## 2023-05-30 RX ORDER — METRONIDAZOLE 250 MG/1
1 TABLET TABLET ORAL TWICE A DAY
Qty: 14 TABLET | Refills: 0 | Status: ACTIVE | COMMUNITY

## 2023-05-30 RX ORDER — CALCIUM CITRATE/VITAMIN D3 200MG-6.25
TAKE  TABLET DAILY PT STATES 400MGS TABLET ORAL
Refills: 0 | Status: ACTIVE | COMMUNITY
Start: 2011-08-19

## 2023-05-30 RX ORDER — ASCORBIC ACID 500 MG
TAKE  TABLET  PT STATES 500MGS DAILY TABLET ORAL
Refills: 0 | Status: ACTIVE | COMMUNITY
Start: 2011-08-19

## 2023-05-30 NOTE — HPI-TODAY'S VISIT:
87-year-old female with a history of diverticulitis, esophageal reflux, functional dyspepsia, mesenteric cyst (resolved), benign liver hemangiomas, presenting for follow-up of abdominal pain, bloating, and constipation.  . She has a bowel movement on the days she takes milk of magnesia. She will take it for about two days then hold it for a day, simply because she gets tired of taking the medication. She continues to experience lower abdominal pain, gas and bloating following meals. . Recently her Prozac was discontinued.  This resulted in significant diarrhea.  She has been switched to Remeron.  She has chronic nausea.  She has chronic back pain.  She requires narcotic therapy for her back discomfort.  No reports of rectal bleeding or melena.  Milk of Magnesia is effective in controlling her constipation problems.  She has a bowel movement once a day.  She gets tired of milk magnesia and sometimes she skips a day.

## 2023-05-30 NOTE — HPI-TODAY'S VISIT:
. She was seen in the office in December 2022 for evaluation of worsening abdominal pain, bloating and constipation despite dietary modification and milk of magnesia. She was unable to tolerate Linzess due to abdominal cramping . . Previously, she did not take the antibiotics due to concern for potential side effects. She was unable to obtain the Trulance due to cost. She could not tolerate linzess or IBSrela. IBgard was ineffective.

## 2023-05-30 NOTE — HPI-OTHER HISTORIES
. CT scan abdomen pelvis with contrast March 2023.  Chronic compression deformities of L3, L1, T12, and T11.  Severe right hip arthritis.  Bilateral inguinal hernias.  Pancreas was said to be normal.  Gallbladder normal.  Liver normal. . 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 . Labs 10/4/2022:H/H12.3/37.9, MCV 94.8, , WBC 11.1.  AST 24, ALT 15, .59, T bili 0.4, CMP otherwise unremarkable. . CT scan April 12, 2021.  Wall thickening and edema of the sigmoid colon identified with stranding consistent with diverticulitis.  Left inguinal hernia with small intestinal loops without obstruction identified.  . Celiac serologies were negative Jan 2021. . Colonoscopy by Dr. Espinoza Oct 2020: distal rectal nodule 1cm at dentate and sigmoid diverticulosis. Biopsies showed mucosal prolapse polyp with focal erosion/solitary rectal ulcer syndrome, negative for dysplasia.  . EGD 8/19/11: mild granular mucosa at the GEJ, a small hiatal hernia and extrinsic compression of the gastric fundus. Gastric biopsies were negative for H. pylori. . Colonoscopy 9/19/07 tortuous sigmoid colon, a "floppy colon", moderate sigmoid diverticulosis, and was otherwise negative.

## 2023-10-16 ENCOUNTER — OFFICE VISIT (OUTPATIENT)
Dept: URBAN - METROPOLITAN AREA CLINIC 113 | Facility: CLINIC | Age: 88
End: 2023-10-16
Payer: MEDICARE

## 2023-10-16 ENCOUNTER — TELEPHONE ENCOUNTER (OUTPATIENT)
Dept: URBAN - METROPOLITAN AREA CLINIC 113 | Facility: CLINIC | Age: 88
End: 2023-10-16

## 2023-10-16 VITALS
DIASTOLIC BLOOD PRESSURE: 90 MMHG | SYSTOLIC BLOOD PRESSURE: 149 MMHG | BODY MASS INDEX: 12.5 KG/M2 | HEART RATE: 100 BPM | TEMPERATURE: 97.3 F | WEIGHT: 73.2 LBS | HEIGHT: 64 IN

## 2023-10-16 DIAGNOSIS — K57.50 DIVERTICULOSIS OF BOTH SMALL AND LARGE INTESTINE: ICD-10-CM

## 2023-10-16 DIAGNOSIS — R63.0 DECREASED APPETITE: ICD-10-CM

## 2023-10-16 DIAGNOSIS — K21.9 GERD: ICD-10-CM

## 2023-10-16 DIAGNOSIS — K59.01 CONSTIPATION: ICD-10-CM

## 2023-10-16 DIAGNOSIS — K59.04 CHRONIC IDIOPATHIC CONSTIPATION: ICD-10-CM

## 2023-10-16 DIAGNOSIS — K57.90 DIVERTICULOSIS: ICD-10-CM

## 2023-10-16 DIAGNOSIS — K58.2 IRRITABLE BOWEL SYNDROME WITH BOTH CONSTIPATION AND DIARRHEA: ICD-10-CM

## 2023-10-16 DIAGNOSIS — K58.1 IRRITABLE BOWEL SYNDROME WITH CONSTIPATION: ICD-10-CM

## 2023-10-16 DIAGNOSIS — R10.84 GENERALIZED ABDOMINAL PAIN: ICD-10-CM

## 2023-10-16 DIAGNOSIS — K40.90 INGUINAL HERNIA: ICD-10-CM

## 2023-10-16 PROCEDURE — 99214 OFFICE O/P EST MOD 30 MIN: CPT | Performed by: INTERNAL MEDICINE

## 2023-10-16 RX ORDER — HYDROCODONE BITARTRATE AND ACETAMINOPHEN 10; 325 MG/1; MG/1
1 TABLET AS NEEDED TABLET ORAL
Status: ACTIVE | COMMUNITY

## 2023-10-16 RX ORDER — PANTOPRAZOLE SODIUM 20 MG/1
1 TABLET TABLET, DELAYED RELEASE ORAL ONCE A DAY
Qty: 30 | Refills: 11 | Status: ACTIVE | COMMUNITY
Start: 2023-05-30

## 2023-10-16 RX ORDER — CALCIUM CITRATE/VITAMIN D3 200MG-6.25
TAKE  TABLET DAILY PT STATES 400MGS TABLET ORAL
Refills: 0 | Status: ACTIVE | COMMUNITY
Start: 2011-08-19

## 2023-10-16 RX ORDER — ONDANSETRON 4 MG/1
1 TABLET ON THE TONGUE AND ALLOW TO DISSOLVE TABLET, ORALLY DISINTEGRATING ORAL
Qty: 90 | Refills: 0 | Status: ACTIVE | COMMUNITY
Start: 2022-08-31

## 2023-10-16 RX ORDER — MIRTAZAPINE 15 MG/1
1 TABLET AT BEDTIME TABLET, FILM COATED ORAL ONCE A DAY
Status: ACTIVE | COMMUNITY

## 2023-10-16 NOTE — HPI-OTHER HISTORIES
CT scan abdomen pelvis with contrast March 2023.  Chronic compression deformities of L3, L1, T12, and T11.  Severe right hip arthritis.  Bilateral inguinal hernias.  Pancreas was said to be normal.  Gallbladder normal.  Liver normal.  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  Labs 10/4/2022:H/H12.3/37.9, MCV 94.8, , WBC 11.1.  AST 24, ALT 15, .59, T bili 0.4, CMP otherwise unremarkable.  CT scan April 12, 2021.  Wall thickening and edema of the sigmoid colon identified with stranding consistent with diverticulitis.  Left inguinal hernia with small intestinal loops without obstruction identified.   Celiac serologies were negative Jan 2021.  Colonoscopy by Dr. Espinoza Oct 2020: distal rectal nodule 1cm at dentate and sigmoid diverticulosis. Biopsies showed mucosal prolapse polyp with focal erosion/solitary rectal ulcer syndrome, negative for dysplasia.   EGD 8/19/11: mild granular mucosa at the GEJ, a small hiatal hernia and extrinsic compression of the gastric fundus. Gastric biopsies were negative for H. pylori.  Colonoscopy 9/19/07 tortuous sigmoid colon, a "floppy colon", moderate sigmoid diverticulosis, and was otherwise negative.

## 2023-10-16 NOTE — HPI-TODAY'S VISIT:
87-year-old female with a history of diverticulitis, esophageal reflux, functional dyspepsia, mesenteric cyst (resolved), benign liver hemangiomas, presenting for follow-up of abdominal pain, bloating, and constipation.   Very difficult situation. She continues to have difficulty with chronic abdominal bloating, increased appetite, early satiety and opiate-induced constipation. She states that she feels that very quickly. However she does eat eggs and king in the morning. She does not complain of dysphagia. She continues on fairly high doses of the products given a small stature and age.  Her daughter is with her today. Clearly frustrated about the situation. There are no easy answers.  She has a bowel movement on the days she takes milk of magnesia. She will take it for about two days then hold it for a day, simply because she gets tired of taking the medication. She continues to experience lower abdominal pain, gas and bloating following meals.  Previously her Prozac was discontinued due to significant diarrhea.  She was switched to Remeron.  She has chronic nausea.  She has chronic back pain.  She requires narcotic therapy for her back discomfort.

## 2023-10-16 NOTE — PHYSICAL EXAM NECK/THYROID:
normal appearance, without tenderness upon palpation, no deformities, trachea midline, no masses , no JVD , no lymphadenopathy. carotid pulse normal , normal appearance, without tenderness upon palpation, no deformities, trachea midline, no masses , no JVD , no lymphadenopathy. carotid pulse normal , normal appearance, without tenderness upon palpation, no deformities, trachea midline, no masses , no JVD , no lymphadenopathy. carotid pulse normal

## 2023-10-16 NOTE — PHYSICAL EXAM HENT:
Head,  normocephalic,  atraumatic,  Face,  Face within normal limits,  Ears,  External ears within normal limits,  Nose/Nasopharynx,  External nose  normal appearance,  nares patent,  no nasal discharge,  Mouth and Throat,  Oral cavity appearance normal, Lips,  Appearance normal , Head,  normocephalic,  atraumatic,  Face,  Face within normal limits,  Ears,  External ears within normal limits,  Nose/Nasopharynx,  External nose  normal appearance,  nares patent,  no nasal discharge,  Mouth and Throat,  Oral cavity appearance normal, Lips,  Appearance normal , Head,  normocephalic,  atraumatic,  Face,  Face within normal limits,  Ears,  External ears within normal limits,  Nose/Nasopharynx,  External nose  normal appearance,  nares patent,  no nasal discharge,  Mouth and Throat,  Oral cavity appearance normal, Lips,  Appearance normal

## 2023-10-16 NOTE — PHYSICAL EXAM GASTROINTESTINAL
Abdomen , soft, nontender, nondistended, no guarding or rigidity, no masses palpable, normal bowel sounds. Liver and Spleen, no hepatomegaly present, no hepatosplenomegaly, liver nontender, spleen not palpable , Abdomen , soft, nontender, nondistended, no guarding or rigidity, no masses palpable, normal bowel sounds. Liver and Spleen, no hepatomegaly present, no hepatosplenomegaly, liver nontender, spleen not palpable , Abdomen , soft, nontender, nondistended, no guarding or rigidity, no masses palpable, normal bowel sounds. Liver and Spleen, no hepatomegaly present, no hepatosplenomegaly, liver nontender, spleen not palpable

## 2023-10-16 NOTE — HPI-TODAY'S VISIT:
She was seen in the office in December 2022 for evaluation of worsening abdominal pain, bloating and constipation despite dietary modification and milk of magnesia. She was unable to tolerate Linzess due to abdominal cramping .  Previously, she did not take the antibiotics due to concern for potential side effects. She was unable to obtain the Trulance due to cost. She could not tolerate linzess or IBSrela. IBgard was ineffective.

## 2023-10-16 NOTE — PHYSICAL EXAM LUNGS:
clear to auscultation bilaterally, good air movement , clear to auscultation bilaterally, good air movement , clear to auscultation bilaterally, good air movement

## 2023-10-19 ENCOUNTER — TELEPHONE ENCOUNTER (OUTPATIENT)
Dept: URBAN - METROPOLITAN AREA CLINIC 113 | Facility: CLINIC | Age: 88
End: 2023-10-19

## 2023-10-19 RX ORDER — DICYCLOMINE HYDROCHLORIDE 10 MG/1
1 CAPSULE CAPSULE ORAL
Qty: 60 CAPSULES | Refills: 3

## 2023-11-06 ENCOUNTER — OFFICE VISIT (OUTPATIENT)
Dept: URBAN - METROPOLITAN AREA CLINIC 113 | Facility: CLINIC | Age: 88
End: 2023-11-06
Payer: MEDICARE

## 2023-11-06 ENCOUNTER — DASHBOARD ENCOUNTERS (OUTPATIENT)
Age: 88
End: 2023-11-06

## 2023-11-06 VITALS
BODY MASS INDEX: 12.64 KG/M2 | WEIGHT: 74 LBS | HEIGHT: 64 IN | DIASTOLIC BLOOD PRESSURE: 86 MMHG | HEART RATE: 96 BPM | SYSTOLIC BLOOD PRESSURE: 155 MMHG | TEMPERATURE: 97.5 F

## 2023-11-06 DIAGNOSIS — K58.2 IRRITABLE BOWEL SYNDROME WITH BOTH CONSTIPATION AND DIARRHEA: ICD-10-CM

## 2023-11-06 DIAGNOSIS — R10.84 GENERALIZED ABDOMINAL PAIN: ICD-10-CM

## 2023-11-06 DIAGNOSIS — K40.90 INGUINAL HERNIA: ICD-10-CM

## 2023-11-06 DIAGNOSIS — K58.1 IRRITABLE BOWEL SYNDROME WITH CONSTIPATION: ICD-10-CM

## 2023-11-06 DIAGNOSIS — R63.0 DECREASED APPETITE: ICD-10-CM

## 2023-11-06 DIAGNOSIS — K59.04 CHRONIC IDIOPATHIC CONSTIPATION: ICD-10-CM

## 2023-11-06 DIAGNOSIS — K57.90 DIVERTICULOSIS: ICD-10-CM

## 2023-11-06 DIAGNOSIS — K57.50 DIVERTICULOSIS OF BOTH SMALL AND LARGE INTESTINE: ICD-10-CM

## 2023-11-06 DIAGNOSIS — K21.9 GERD: ICD-10-CM

## 2023-11-06 DIAGNOSIS — K59.01 CONSTIPATION: ICD-10-CM

## 2023-11-06 PROCEDURE — 99214 OFFICE O/P EST MOD 30 MIN: CPT | Performed by: INTERNAL MEDICINE

## 2023-11-06 RX ORDER — DICYCLOMINE HYDROCHLORIDE 10 MG/1
1 CAPSULE CAPSULE ORAL
Qty: 60 CAPSULES | Refills: 3 | Status: ACTIVE | COMMUNITY

## 2023-11-06 RX ORDER — HYDROCODONE BITARTRATE AND ACETAMINOPHEN 10; 325 MG/1; MG/1
1 TABLET AS NEEDED TABLET ORAL
Status: ACTIVE | COMMUNITY

## 2023-11-06 RX ORDER — CALCIUM CITRATE/VITAMIN D3 200MG-6.25
TAKE  TABLET DAILY PT STATES 400MGS TABLET ORAL
Refills: 0 | Status: ACTIVE | COMMUNITY
Start: 2011-08-19

## 2023-11-06 RX ORDER — PANTOPRAZOLE SODIUM 20 MG/1
1 TABLET TABLET, DELAYED RELEASE ORAL ONCE A DAY
Qty: 30 | Refills: 11 | Status: ACTIVE | COMMUNITY
Start: 2023-05-30

## 2023-11-06 RX ORDER — MIRTAZAPINE 15 MG/1
1 TABLET AT BEDTIME TABLET, FILM COATED ORAL ONCE A DAY
Status: ACTIVE | COMMUNITY

## 2023-11-06 RX ORDER — ONDANSETRON 4 MG/1
1 TABLET ON THE TONGUE AND ALLOW TO DISSOLVE TABLET, ORALLY DISINTEGRATING ORAL
Qty: 90 | Refills: 0 | Status: ACTIVE | COMMUNITY
Start: 2022-08-31

## 2023-11-06 NOTE — HPI-TODAY'S VISIT:
87-year-old female with a history of diverticulitis, esophageal reflux, functional dyspepsia, mesenteric cyst (resolved), benign liver hemangiomas, presenting for follow-up of abdominal pain, bloating, and constipation.   She was unable to tolerate Linzess due to abdominal cramping . She was unable to obtain the Trulance due to cost. She could not tolerate linzess or IBSrela. IBgard was ineffective.  Maybe she is slightly better relative to previous evaluations. When she takes the milk of magnesia she has a bowel movement. She did not like the magnesium oxide. She states the magnesium citrate tablets were too large to swallow. No rectal bleeding. Complains of abdominal bloating and abdominal distention. Some degree of discomfort. Better when she has a bowel movement.  She continues to have difficulty with chronic abdominal bloating, increased appetite, early satiety and opiate-induced constipation.   Her daughter is with her today again  Previously her prior antidepressant was discontinued due to significant diarrhea.  She was switched to Remeron.  She has chronic nausea.  She has chronic back pain.  She requires narcotic therapy for her back discomfort.

## 2023-12-13 ENCOUNTER — ERX REFILL RESPONSE (OUTPATIENT)
Dept: URBAN - METROPOLITAN AREA CLINIC 107 | Facility: CLINIC | Age: 88
End: 2023-12-13

## 2023-12-13 RX ORDER — ONDANSETRON 4 MG/1
DISSOLVE ONE (1) TABLET UNDER THE TONGUE EVERY FOUR (4) TO SIX (6) HOURS AS NEEDED TABLET, ORALLY DISINTEGRATING ORAL
Qty: 90 TABLET | Refills: 1 | OUTPATIENT

## 2023-12-13 RX ORDER — ONDANSETRON 8 MG/1
1 TABLET ON THE TONGUE AND ALLOW TO DISSOLVE IF NEEDED TABLET, ORALLY DISINTEGRATING ORAL
Qty: 90 | Refills: 6 | OUTPATIENT